# Patient Record
Sex: MALE | Race: ASIAN | URBAN - METROPOLITAN AREA
[De-identification: names, ages, dates, MRNs, and addresses within clinical notes are randomized per-mention and may not be internally consistent; named-entity substitution may affect disease eponyms.]

---

## 2018-01-05 ENCOUNTER — HOSPITAL ENCOUNTER (OUTPATIENT)
Dept: ULTRASOUND IMAGING | Age: 78
Discharge: HOME | End: 2018-01-05
Attending: INTERNAL MEDICINE | Admitting: INTERNAL MEDICINE

## 2018-01-05 DIAGNOSIS — R60.0 LOCALIZED EDEMA: ICD-10-CM

## 2018-02-21 ENCOUNTER — HOSPITAL ENCOUNTER (OUTPATIENT)
Dept: PHYSICAL THERAPY | Age: 78
Discharge: HOME | End: 2018-02-28
Attending: INTERNAL MEDICINE | Admitting: INTERNAL MEDICINE

## 2018-02-21 NOTE — PROGRESS NOTES
PHYSICAL THERAPY LYMPHEDEMA EVAL    Patient Name:  Rosie Denson Date:  2018  :  1940 MRN: [id number]  Referring Physician:  Dr. Candis Steven Diagnosis:  LE lymphdema    SUBJECTIVE    History of edema:  Onset in 2017 in L foot, progressed into leg. Improved with:  Some meds, compression socks - has knee and thigh highs 20-30. Worsened with:  dependency    Functional Capacity:  independent  Edema Induced Limitations:      Assistance available for treatment:  Self, wife  Home/social/DME: has socks he thinks are 15-18mmHg, thigh highs 20-30mmHg  Financial resources available for treatment:  self    Pain:  none    Patient Goal(s):  Resolve swelling    OBJECTIVE    Other medical conditions/surgeries: history of prostate surgery/ca    General Observations of Functional Mobility:  WFL's rom, transfers    Description of Edema:  Minimal edema at leg    Observations of Skin:  Intact, warm, moist.  Mildly darker than L.      Scars:  na    Skin breakdown (current or previous):  none    Initial Basic Measures      LE Right Left   Met heads 23.1 23.0   Transmalleolar 24.2 25.5   10cm 20.8 22.2   20cm 22.8 24.6   30cm 31.5 32.4   40cm 30.8 32.0         CURRENT  GOAL  DISCHARGE   [] CH (0% Impaired, Indep.)  [x] CI  (1-19% Imp.,SB-CG)  [] CJ  (20-39% Imp., MIN A)  [] CK (40-59% Imp., Mod A)  [] CL  (60-79% Imp., Max A)  [] CM (80-99% Imp., Dep.)   [] CN (100% Imp.,TotDep.) [] CH (0% Impaired, Indep.)  [x] CI  (1-19% Imp.,SB-CG)  [] CJ (20-39% Imp., MIN A)  [] CK  (40-59% Imp., Mod A)  [] CL  (60-79% Imp., Max A)  [] CM  (80-99% Imp., Dep.)   [] CN  (100% Imp.,TotDep.)  [] CH (0% Impaired, Indep.)  [x] CI  (1-19% Imp.,SB-CG)  [] CJ (20-39% Imp., MIN A)  [] CK  (40-59% Imp., Mod A)  [] CL  (60-79% Imp., Max A)  [] CM  (80-99% Imp., Dep.)   [] CN  (100% Imp.,TotDep.)      ASSESSMENT         Pt presents with complaints of L LE swelling onset in December that progressively worsened until a couple weeks cereals, soups/broths, lunch/deli meats, snack foods, fast foods, etc.  Explain general anatomy and function of the lymphatic system, effects of treatment, compression, exercise/muscle pump, elevation. Ed on donning compression wear. Central clearing. Time In:  1600   Time Out:  1705  Timed Code Treatment Minutes:  30  Total Treatment Minutes:  65    Electronically signed by:  Jesús Henley PT, 2/21/2018, 4:01 PM        Physical Therapy Certification Form  Dear Dr. Rosa Souza:    Please see the initial evaluation above for the initial plan of care. Electronically signed by:  Jesús Henley PT      If you have any questions or concerns, please don't hesitate to call. The outpatient phone and fax numbers are listed below. Thank you for your referral.      Physician Signature:________________________________Date:__________________  By signing above, therapists plan is approved by physician  Please fax back if you agree with this plan.       [facility]       [x] Phone: [phone]   Fax: [phone]   [facility]     [] Phone: [phone]   Fax: [phone]          [] Phone: [phone]   Fax: [phone]  [facility]           [] Phone: [phone]   Fax: [phone]    P[facility] Therapy          [] Phone: [phone]   Fax: [phone]  [facility]          [] Phone: [phone]   Fax: [phone]

## 2018-03-01 ENCOUNTER — HOSPITAL ENCOUNTER (OUTPATIENT)
Dept: OTHER | Age: 78
Discharge: HOME | End: 2018-03-31
Attending: INTERNAL MEDICINE | Admitting: INTERNAL MEDICINE

## 2018-06-05 ENCOUNTER — PAT TELEPHONE (OUTPATIENT)
Dept: SURGERY | Age: 78
End: 2018-06-05

## 2018-06-05 VITALS — BODY MASS INDEX: 20.46 KG/M2 | HEIGHT: 68 IN | WEIGHT: 135 LBS

## 2018-06-08 ENCOUNTER — HOSPITAL ENCOUNTER (OUTPATIENT)
Dept: SURGERY | Age: 78
Discharge: HOME | End: 2018-06-08
Attending: SPECIALIST | Admitting: SPECIALIST

## 2018-06-08 VITALS
RESPIRATION RATE: 16 BRPM | TEMPERATURE: 98.2 F | HEART RATE: 71 BPM | HEIGHT: 68 IN | OXYGEN SATURATION: 98 % | SYSTOLIC BLOOD PRESSURE: 110 MMHG | WEIGHT: 134 LBS | DIASTOLIC BLOOD PRESSURE: 60 MMHG | BODY MASS INDEX: 20.31 KG/M2

## 2018-06-08 RX ORDER — SODIUM CHLORIDE, SODIUM LACTATE, POTASSIUM CHLORIDE, CALCIUM CHLORIDE 600; 310; 30; 20 MG/100ML; MG/100ML; MG/100ML; MG/100ML
INJECTION, SOLUTION INTRAVENOUS CONTINUOUS
Status: DISCONTINUED | OUTPATIENT
Start: 2018-06-08 | End: 2018-06-09 | Stop reason: HOSPADM

## 2018-06-08 RX ADMIN — SODIUM CHLORIDE, SODIUM LACTATE, POTASSIUM CHLORIDE, CALCIUM CHLORIDE: 600; 310; 30; 20 INJECTION, SOLUTION INTRAVENOUS at 09:50

## 2018-06-08 ASSESSMENT — PAIN SCALES - GENERAL
PAINLEVEL_OUTOF10: 0
PAINLEVEL_OUTOF10: 0

## 2018-06-08 ASSESSMENT — PAIN - FUNCTIONAL ASSESSMENT: PAIN_FUNCTIONAL_ASSESSMENT: 0-10

## 2019-10-31 ENCOUNTER — APPOINTMENT (RX ONLY)
Dept: URBAN - METROPOLITAN AREA CLINIC 377 | Facility: CLINIC | Age: 79
Setting detail: DERMATOLOGY
End: 2019-10-31

## 2019-10-31 DIAGNOSIS — L57.0 ACTINIC KERATOSIS: ICD-10-CM

## 2019-10-31 PROCEDURE — ? EDUCATIONAL RESOURCES PROVIDED

## 2019-10-31 PROCEDURE — 17000 DESTRUCT PREMALG LESION: CPT

## 2019-10-31 PROCEDURE — ? COUNSELING

## 2019-10-31 PROCEDURE — ? ADDITIONAL NOTES

## 2019-10-31 PROCEDURE — ? LIQUID NITROGEN

## 2019-10-31 PROCEDURE — ? PRESCRIPTION

## 2019-10-31 RX ORDER — FLUOROURACIL 50 MG/G
CREAM TOPICAL
Qty: 1 | Refills: 2 | Status: ERX | COMMUNITY
Start: 2019-10-31

## 2019-10-31 RX ADMIN — FLUOROURACIL: 50 CREAM TOPICAL at 20:22

## 2019-10-31 ASSESSMENT — LOCATION SIMPLE DESCRIPTION DERM: LOCATION SIMPLE: SUPERIOR FOREHEAD

## 2019-10-31 ASSESSMENT — LOCATION DETAILED DESCRIPTION DERM: LOCATION DETAILED: SUPERIOR MID FOREHEAD

## 2019-10-31 ASSESSMENT — LOCATION ZONE DERM: LOCATION ZONE: FACE

## 2019-10-31 NOTE — PROCEDURE: ADDITIONAL NOTES
Detail Level: Simple
Additional Notes: Advised will freeze lesion on forehead that was previously biopsied as AK. Patient to apply Efudex cream to scalp BID x 2 weeks to eradicate any underlying sun damage. Patient traveling and advised to do Efudex cream when he returns. RTC 2 month recheck on scalp. All questions answered.

## 2019-10-31 NOTE — PROCEDURE: LIQUID NITROGEN
Render Note In Bullet Format When Appropriate: No
Consent: The patient's consent was obtained including but not limited to risks of crusting, scabbing, blistering, scarring, darker or lighter pigmentary change, recurrence, incomplete removal and infection.
Number Of Freeze-Thaw Cycles: 1 freeze-thaw cycle
Render Post-Care Instructions In Note?: yes
Detail Level: Simple
Post-Care Instructions: I reviewed with the patient in detail post-care instructions. Patient is to wear sunprotection, and avoid picking at any of the treated lesions. Pt may apply Vaseline to crusted or scabbing areas.
Duration Of Freeze Thaw-Cycle (Seconds): 5